# Patient Record
Sex: MALE | Race: WHITE
[De-identification: names, ages, dates, MRNs, and addresses within clinical notes are randomized per-mention and may not be internally consistent; named-entity substitution may affect disease eponyms.]

---

## 2018-07-28 ENCOUNTER — HOSPITAL ENCOUNTER (OUTPATIENT)
Dept: HOSPITAL 47 - RADMRIMAIN | Age: 48
Discharge: HOME | End: 2018-07-28
Attending: PHYSICAL MEDICINE & REHABILITATION
Payer: COMMERCIAL

## 2018-07-28 DIAGNOSIS — M99.71: ICD-10-CM

## 2018-07-28 DIAGNOSIS — M48.02: Primary | ICD-10-CM

## 2018-07-28 DIAGNOSIS — M50.222: ICD-10-CM

## 2018-07-28 DIAGNOSIS — M50.30: ICD-10-CM

## 2018-07-28 DIAGNOSIS — M47.812: ICD-10-CM

## 2018-07-28 PROCEDURE — 72141 MRI NECK SPINE W/O DYE: CPT

## 2018-07-28 NOTE — MR
EXAMINATION TYPE: MR cervical spine wo con

 

DATE OF EXAM: 7/28/2018

 

COMPARISON: CT cervical spine dated 4/7/2016

 

HISTORY: Spinal stenosis, cervical region

 

TECHNIQUE: 

Multiplanar, multisequence images of the cervical spine were acquired.

 

FINDINGS: There is mild anterolisthesis (grade 1) of C2 on C3). There is also mild retrolisthesis of 
C5 on C6. Vertebral bodies of the cervical spine maintains normal vertebral body heights. No suspicio
us extra-axial fluid collection is seen. Prevertebral soft tissues demonstrate no thickening. Small a
mount of fluid is seen within the upper esophagus. Visualized portions of posterior fossa are grossly
 unremarkable. Spinal cord signal is overall homogeneous throughout.

 

C2-C3: There is a small central posterior disc osteophyte complex and facet arthropathy as well as un
covertebral hypertrophy creating minimal mass effect on the ventral thecal sac without significant sp
inal canal stenosis. No significant neural foraminal narrowing.

 

C3-C4: There is uncovertebral hypertrophy and facet arthropathy greater on the left than right. Moder
ate left neural foraminal narrowing. A broad-based disc bulge is also seen creating mild spinal canal
 stenosis. Right neural foramen is patent.

 

C4-C5: There is a broad-based disc bulge, uncovertebral hypertrophy and facet arthropathy creating mi
ld bilateral neural foraminal narrowing and mild spinal canal stenosis.

 

C5-C6: There is a left paracentral disc herniation/protrusion superimposed upon a broad-based is bulg
e. There is extent into the left neural foramen creating severe left neural foraminal narrowing in co
mbination with uncovertebral hypertrophy and facet arthropathy. There is additionally moderate right 
neural foraminal narrowing and mild spinal canal stenosis.

 

C6-C7: There is a large left eccentric broad-based disc bulge, facet arthropathy and uncovertebral hy
pertrophy that create severe left and moderate right neural foraminal narrowing and mild spinal canal
 stenosis.

 

C7-T1: No evidence for degenerative disc disease.  No disc bulge/herniation or protrusion.  No Canal 
stenosis.  Foramina are patent bilaterally.

 

There is partial visualization of a right paracentral disc herniation at T2-T3 extending into the rig
ht neural foramen creating moderate right neural foraminal narrowing.

 

IMPRESSION:

1. Left paracentral disc herniation with extensive degenerative changes at C5-C6 grating or left neur
oforaminal narrowing, moderate right neural foraminal narrowing and mild spinal canal stenosis.

2. Partial visualization of a right paracentral disc herniation at T2-3 extending into the right neur
al foramen causing moderate neural foraminal narrowing.

3. Multilevel degenerative disc disease and malalignment, likely on a degenerative basis. These findi
ngs create mild spinal canal stenosis at C3-C4 and C4-C5 as well as at C6-C7 and variable degrees of 
neural foraminal narrowing as described above.

## 2019-07-12 ENCOUNTER — HOSPITAL ENCOUNTER (OUTPATIENT)
Dept: HOSPITAL 47 - LABWHC1 | Age: 49
End: 2019-07-12
Attending: OTOLARYNGOLOGY
Payer: COMMERCIAL

## 2019-07-12 DIAGNOSIS — R53.83: Primary | ICD-10-CM

## 2019-07-12 LAB
BASOPHILS # BLD AUTO: 0 K/UL (ref 0–0.2)
BASOPHILS NFR BLD AUTO: 1 %
EOSINOPHIL # BLD AUTO: 0.3 K/UL (ref 0–0.7)
EOSINOPHIL NFR BLD AUTO: 5 %
ERYTHROCYTE [DISTWIDTH] IN BLOOD BY AUTOMATED COUNT: 4.43 M/UL (ref 4.3–5.9)
ERYTHROCYTE [DISTWIDTH] IN BLOOD: 13.3 % (ref 11.5–15.5)
HCT VFR BLD AUTO: 41.5 % (ref 39–53)
HGB BLD-MCNC: 13.9 GM/DL (ref 13–17.5)
LYMPHOCYTES # SPEC AUTO: 1.6 K/UL (ref 1–4.8)
LYMPHOCYTES NFR SPEC AUTO: 28 %
MCH RBC QN AUTO: 31.4 PG (ref 25–35)
MCHC RBC AUTO-ENTMCNC: 33.5 G/DL (ref 31–37)
MCV RBC AUTO: 93.8 FL (ref 80–100)
MONOCYTES # BLD AUTO: 0.3 K/UL (ref 0–1)
MONOCYTES NFR BLD AUTO: 5 %
NEUTROPHILS # BLD AUTO: 3.5 K/UL (ref 1.3–7.7)
NEUTROPHILS NFR BLD AUTO: 60 %
PLATELET # BLD AUTO: 238 K/UL (ref 150–450)
T4 FREE SERPL-MCNC: 1 NG/DL (ref 0.8–1.8)
VIT B12 SERPL-MCNC: 692 PG/ML (ref 200–944)
WBC # BLD AUTO: 5.8 K/UL (ref 3.8–10.6)

## 2019-07-12 PROCEDURE — 85025 COMPLETE CBC W/AUTO DIFF WBC: CPT

## 2019-07-12 PROCEDURE — 36415 COLL VENOUS BLD VENIPUNCTURE: CPT

## 2019-07-12 PROCEDURE — 84439 ASSAY OF FREE THYROXINE: CPT

## 2019-07-12 PROCEDURE — 86140 C-REACTIVE PROTEIN: CPT

## 2019-07-12 PROCEDURE — 82306 VITAMIN D 25 HYDROXY: CPT

## 2019-07-12 PROCEDURE — 84443 ASSAY THYROID STIM HORMONE: CPT

## 2019-07-12 PROCEDURE — 84402 ASSAY OF FREE TESTOSTERONE: CPT

## 2019-07-12 PROCEDURE — 82607 VITAMIN B-12: CPT

## 2019-07-12 PROCEDURE — 84403 ASSAY OF TOTAL TESTOSTERONE: CPT
